# Patient Record
Sex: FEMALE | URBAN - METROPOLITAN AREA
[De-identification: names, ages, dates, MRNs, and addresses within clinical notes are randomized per-mention and may not be internally consistent; named-entity substitution may affect disease eponyms.]

---

## 2024-06-06 ENCOUNTER — APPOINTMENT (RX ONLY)
Dept: URBAN - METROPOLITAN AREA CLINIC 41 | Facility: CLINIC | Age: 54
Setting detail: DERMATOLOGY
End: 2024-06-06

## 2024-06-06 DIAGNOSIS — L60.9 NAIL DISORDER, UNSPECIFIED: ICD-10-CM

## 2024-06-06 PROCEDURE — ? COUNSELING

## 2024-06-06 PROCEDURE — ? TREATMENT REGIMEN

## 2024-06-06 PROCEDURE — 99202 OFFICE O/P NEW SF 15 MIN: CPT

## 2024-06-06 NOTE — HPI: NAIL DISORDER
Is This A New Presentation, Or A Follow-Up?: Nail Disorder
How Severe Is It?: severe
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 It was removed and sutures that was 2016. In 2021 she went back and it was just a nail clipping. She had a nail biopsy in 2017. They said it was nothing not melanoma or fungal.

## 2024-06-13 ENCOUNTER — APPOINTMENT (RX ONLY)
Dept: URBAN - METROPOLITAN AREA CLINIC 41 | Facility: CLINIC | Age: 54
Setting detail: DERMATOLOGY
End: 2024-06-13

## 2024-06-13 DIAGNOSIS — L60.9 NAIL DISORDER, UNSPECIFIED: ICD-10-CM

## 2024-06-13 PROCEDURE — 11750 EXCISION NAIL&NAIL MATRIX: CPT | Mod: T5

## 2024-06-13 PROCEDURE — ? MATRIECTOMY

## 2024-06-13 ASSESSMENT — LOCATION DETAILED DESCRIPTION DERM: LOCATION DETAILED: RIGHT GREAT TOENAIL

## 2024-06-13 ASSESSMENT — LOCATION ZONE DERM: LOCATION ZONE: TOENAIL

## 2024-06-13 ASSESSMENT — LOCATION SIMPLE DESCRIPTION DERM: LOCATION SIMPLE: RIGHT GREAT TOE

## 2024-06-13 NOTE — PROCEDURE: MATRIECTOMY
Detail Level: Detailed
Matricectomy Method: electrodesiccation
Nail Avulsion: Yes- Partial
Anesthesia Method: Digital block
Anesthesia Type: 1% lidocaine with epinephrine
Anesthesia Volume In Cc: 9
Additional Anesthesia Volume In Cc: 0
Hemostasis: None
Pre-Op Text: A tourniquet was applied to the proximal digit and then the site was prepped.
Partial Nail Avulsion Text: An incision was made in each lateral aspect of the proximal nail fold, the nail fold was undermined, and then reflected back for better visualization of the proximal nail matrix. A nail splitter was used to transversely split the nail plate just distal to the matrix, incompletely, so as to allow the nail plate to be reflected away from the matrix. Then, the matrix was scored and the superficial matrix was tangentiallyu excised. The nail plate was then replaced over the matrix and the proximal nail fold was reflected back into normal anatomic position and sutured into place using 4-0 ethilon #4.
Consent: Written consent was obtained and risks were reviewed including but not limited to scarring, infection, bleeding, scabbing, incomplete removal, nerve damage and allergy to anesthesia.
Post-Care Instructions: I reviewed with the patient in detail post-care instructions. Patient is to keep the biopsy site dry overnight, and then apply bacitracin twice daily until healed. Patient may apply hydrogen peroxide soaks to remove any crusting.
Wound Care: Petrolatum
Billing Type: Third-Party Bill
Bill For Surgical Tray: no